# Patient Record
Sex: MALE | Race: WHITE
[De-identification: names, ages, dates, MRNs, and addresses within clinical notes are randomized per-mention and may not be internally consistent; named-entity substitution may affect disease eponyms.]

---

## 2017-02-07 ENCOUNTER — HOSPITAL ENCOUNTER (EMERGENCY)
Dept: HOSPITAL 26 - MED | Age: 22
Discharge: HOME | End: 2017-02-07
Payer: MEDICAID

## 2017-02-07 VITALS — WEIGHT: 198 LBS | HEIGHT: 70 IN | BODY MASS INDEX: 28.35 KG/M2

## 2017-02-07 VITALS — DIASTOLIC BLOOD PRESSURE: 74 MMHG | SYSTOLIC BLOOD PRESSURE: 115 MMHG

## 2017-02-07 VITALS — DIASTOLIC BLOOD PRESSURE: 67 MMHG | SYSTOLIC BLOOD PRESSURE: 118 MMHG

## 2017-02-07 DIAGNOSIS — F41.9: ICD-10-CM

## 2017-02-07 DIAGNOSIS — R50.9: Primary | ICD-10-CM

## 2017-02-07 DIAGNOSIS — R11.2: ICD-10-CM

## 2017-02-07 DIAGNOSIS — R19.7: ICD-10-CM

## 2017-02-07 PROCEDURE — 99283 EMERGENCY DEPT VISIT LOW MDM: CPT

## 2017-02-07 NOTE — NUR
Patient discharged with v/s stable. Written and verbal after care instructions 
given and explained. 

Patient alert, oriented and verbalized understanding of instructions. 
Ambulatory with steady gait. All questions addressed prior to discharge. ID 
band removed. Patient advised to follow up with PMD. Rx of ZOFRAN AND 
ACETAMENOPHEN given. Patient educated on indication of medication including 
possible reaction and side effects. Opportunity to ask questions provided and 
answered.

## 2017-02-07 NOTE — NUR
PATIENT PRESENTS TO ED WITH ABD PAIN, HA, EARACHE, AND N/V/D . PT STATES HE HAS 
HAD THESE SYMPTOMS X2DAYS; HYPERACTIVE BOWEL SOUNDS NOTED IN ALL FOUR 
QUADRANTS. SKIN IS PINK/WARM/DRY; AAOX4 WITH EVEN AND STEADY GAIT; LUNGS CLEAR 
BL; HR EVEN AND REGULAR; PT DENIES ANY FEVER, CP, SOB, OR COUGH AT THIS TIME; 
PATIENT STATES PAIN OF 8/10 AT THIS TIME; VSS; PATIENT POSITIONED FOR COMFORT; 
HOB ELEVATED; BEDRAILS UP X2; BED DOWN. ER MD MADE AWARE OF PT STATUS.

## 2017-03-20 ENCOUNTER — HOSPITAL ENCOUNTER (EMERGENCY)
Dept: HOSPITAL 4 - SED | Age: 22
Discharge: HOME | End: 2017-03-20
Payer: MEDICAID

## 2017-03-20 VITALS — SYSTOLIC BLOOD PRESSURE: 121 MMHG

## 2017-03-20 VITALS — HEIGHT: 70 IN | WEIGHT: 198 LBS | BODY MASS INDEX: 28.35 KG/M2

## 2017-03-20 DIAGNOSIS — K29.20: Primary | ICD-10-CM

## 2017-03-20 DIAGNOSIS — K75.9: ICD-10-CM

## 2017-03-20 DIAGNOSIS — J45.909: ICD-10-CM

## 2017-03-20 LAB
ALBUMIN SERPL BCP-MCNC: 4.2 G/DL (ref 3.4–4.8)
ALT SERPL W P-5'-P-CCNC: 274 U/L (ref 12–78)
ANION GAP SERPL CALCULATED.3IONS-SCNC: < 3 MMOL/L (ref 5–15)
APPEARANCE UR: CLEAR
AST SERPL W P-5'-P-CCNC: 94 U/L (ref 10–37)
BACTERIA URNS QL MICRO: (no result) /HPF
BASOPHILS # BLD AUTO: 0.1 K/UL (ref 0–0.2)
BASOPHILS NFR BLD AUTO: 1.5 % (ref 0–2)
BILIRUB SERPL-MCNC: 2.5 MG/DL (ref 0–1)
BILIRUB UR QL STRIP: (no result)
BUN SERPL-MCNC: 10 MG/DL (ref 8–21)
CALCIUM SERPL-MCNC: 8.6 MG/DL (ref 8.4–11)
CHLORIDE SERPL-SCNC: 101 MMOL/L (ref 98–107)
COLOR UR: YELLOW
CREAT SERPL-MCNC: 1.17 MG/DL (ref 0.55–1.3)
EOSINOPHIL # BLD AUTO: 0.1 K/UL (ref 0–0.4)
EOSINOPHIL NFR BLD AUTO: 1.7 % (ref 0–4)
ERYTHROCYTE [DISTWIDTH] IN BLOOD BY AUTOMATED COUNT: 12.3 % (ref 9–15)
GFR SERPL CREATININE-BSD FRML MDRD: 102 ML/MIN (ref 90–?)
GLUCOSE SERPL-MCNC: 125 MG/DL (ref 70–99)
GLUCOSE UR STRIP-MCNC: NEGATIVE MG/DL
HCT VFR BLD AUTO: 49.7 % (ref 36–54)
HGB BLD-MCNC: 16.9 G/DL (ref 14–18)
HGB UR QL STRIP: (no result)
INR PPP: 1 (ref 0.8–1.2)
KETONES UR STRIP-MCNC: NEGATIVE MG/DL
LEUKOCYTE ESTERASE UR QL STRIP: NEGATIVE
LIPASE SERPL-CCNC: 104 U/L (ref 73–393)
LYMPHOCYTES # BLD AUTO: 1.2 K/UL (ref 1–5.5)
LYMPHOCYTES NFR BLD AUTO: 14 % (ref 20.5–51.5)
MCH RBC QN AUTO: 29 PG (ref 27–31)
MCHC RBC AUTO-ENTMCNC: 34 % (ref 32–36)
MCV RBC AUTO: 86 FL (ref 79–98)
MONOCYTES # BLD MANUAL: 0.7 K/UL (ref 0–1)
MONOCYTES # BLD MANUAL: 8.5 % (ref 1.7–9.3)
MUCOUS THREADS URNS QL MICRO: (no result) /LPF
NEUTROPHILS # BLD AUTO: 6.3 K/UL (ref 1.8–7.7)
NEUTROPHILS NFR BLD AUTO: 74.3 % (ref 40–70)
NITRITE UR QL STRIP: NEGATIVE
PH UR STRIP: 6 [PH] (ref 5–8)
PLATELET # BLD AUTO: 264 K/UL (ref 130–430)
POTASSIUM SERPL-SCNC: 4.3 MMOL/L (ref 3.5–5.1)
PROT SERPL-MCNC: 8.3 G/DL (ref 6.4–8.3)
PROT UR QL STRIP: (no result)
PROTHROMBIN TIME: 11.2 SECS (ref 9.5–12.5)
RBC # BLD AUTO: 5.81 MIL/UL (ref 4.2–6.2)
RBC #/AREA URNS HPF: (no result) /HPF (ref 0–3)
SODIUM SERPLBLD-SCNC: 136 MMOL/L (ref 136–145)
SP GR UR STRIP: 1.02 (ref 1–1.03)
UROBILINOGEN UR STRIP-MCNC: 4 MG/DL (ref 0.2–1)
WBC # BLD AUTO: 8.4 K/UL (ref 4.5–11)
WBC #/AREA URNS HPF: (no result) /HPF (ref 0–3)

## 2018-07-25 ENCOUNTER — HOSPITAL ENCOUNTER (EMERGENCY)
Dept: HOSPITAL 4 - SED | Age: 23
Discharge: HOME | End: 2018-07-25
Payer: SELF-PAY

## 2018-07-25 VITALS — SYSTOLIC BLOOD PRESSURE: 125 MMHG

## 2018-07-25 VITALS — WEIGHT: 195 LBS | BODY MASS INDEX: 27.92 KG/M2 | HEIGHT: 70 IN

## 2018-07-25 DIAGNOSIS — S70.262A: Primary | ICD-10-CM
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
## 2018-07-25 DIAGNOSIS — R03.0: ICD-10-CM

## 2018-07-25 DIAGNOSIS — J45.909: ICD-10-CM

## 2019-05-27 ENCOUNTER — HOSPITAL ENCOUNTER (EMERGENCY)
Dept: HOSPITAL 4 - SED | Age: 24
LOS: 1 days | Discharge: HOME | End: 2019-05-28
Payer: MEDICAID

## 2019-05-27 VITALS — WEIGHT: 165 LBS | BODY MASS INDEX: 23.62 KG/M2 | HEIGHT: 70 IN

## 2019-05-27 VITALS — SYSTOLIC BLOOD PRESSURE: 108 MMHG

## 2019-05-27 DIAGNOSIS — J45.909: ICD-10-CM

## 2019-05-27 DIAGNOSIS — H66.91: Primary | ICD-10-CM

## 2019-05-28 VITALS — SYSTOLIC BLOOD PRESSURE: 108 MMHG

## 2022-01-04 ENCOUNTER — HOSPITAL ENCOUNTER (EMERGENCY)
Dept: HOSPITAL 4 - SED | Age: 27
Discharge: HOME | End: 2022-01-04
Payer: MEDICAID

## 2022-01-04 VITALS — SYSTOLIC BLOOD PRESSURE: 101 MMHG

## 2022-01-04 VITALS — HEIGHT: 70 IN | WEIGHT: 160 LBS | BODY MASS INDEX: 22.9 KG/M2

## 2022-01-04 VITALS — SYSTOLIC BLOOD PRESSURE: 122 MMHG

## 2022-01-04 DIAGNOSIS — Z79.899: ICD-10-CM

## 2022-01-04 DIAGNOSIS — J45.909: ICD-10-CM

## 2022-01-04 DIAGNOSIS — Z88.8: ICD-10-CM

## 2022-01-04 DIAGNOSIS — E11.65: Primary | ICD-10-CM

## 2022-01-04 LAB
ALBUMIN SERPL BCP-MCNC: 3.6 G/DL (ref 3.4–4.8)
ALT SERPL W P-5'-P-CCNC: 22 U/L (ref 12–78)
ANION GAP SERPL CALCULATED.3IONS-SCNC: 9 MMOL/L (ref 5–15)
AST SERPL W P-5'-P-CCNC: 11 U/L (ref 10–37)
BASOPHILS # BLD AUTO: 0 K/UL (ref 0–0.2)
BASOPHILS NFR BLD AUTO: 0.6 % (ref 0–2)
BILIRUB SERPL-MCNC: 1.1 MG/DL (ref 0–1)
BUN SERPL-MCNC: 17 MG/DL (ref 8–21)
CALCIUM SERPL-MCNC: 8.7 MG/DL (ref 8.4–11)
CHLORIDE SERPL-SCNC: 93 MMOL/L (ref 98–107)
CREAT SERPL-MCNC: 0.95 MG/DL (ref 0.55–1.3)
EOSINOPHIL # BLD AUTO: 0.2 K/UL (ref 0–0.4)
EOSINOPHIL NFR BLD AUTO: 2.7 % (ref 0–4)
ERYTHROCYTE [DISTWIDTH] IN BLOOD BY AUTOMATED COUNT: 12.9 % (ref 9–15)
GFR SERPL CREATININE-BSD FRML MDRD: 125 ML/MIN (ref 90–?)
GLUCOSE SERPL-MCNC: 572 MG/DL (ref 70–99)
HCT VFR BLD AUTO: 48.4 % (ref 36–54)
HGB BLD-MCNC: 16.7 G/DL (ref 14–18)
LYMPHOCYTES # BLD AUTO: 1.7 K/UL (ref 1–5.5)
LYMPHOCYTES NFR BLD AUTO: 23.7 % (ref 20.5–51.5)
MCH RBC QN AUTO: 29 PG (ref 27–31)
MCHC RBC AUTO-ENTMCNC: 35 % (ref 32–36)
MCV RBC AUTO: 85 FL (ref 79–98)
MONOCYTES # BLD MANUAL: 0.4 K/UL (ref 0–1)
MONOCYTES # BLD MANUAL: 4.8 % (ref 1.7–9.3)
NEUTROPHILS # BLD AUTO: 5 K/UL (ref 1.8–7.7)
NEUTROPHILS NFR BLD AUTO: 68.2 % (ref 40–70)
PLATELET # BLD AUTO: 250 K/UL (ref 130–430)
POTASSIUM SERPL-SCNC: 4.1 MMOL/L (ref 3.5–5.1)
RBC # BLD AUTO: 5.7 MIL/UL (ref 4.2–6.2)
SODIUM SERPLBLD-SCNC: 131 MMOL/L (ref 136–145)
WBC # BLD AUTO: 7.3 K/UL (ref 4.8–10.8)

## 2022-01-04 PROCEDURE — 36415 COLL VENOUS BLD VENIPUNCTURE: CPT

## 2022-01-04 PROCEDURE — 96374 THER/PROPH/DIAG INJ IV PUSH: CPT

## 2022-01-04 PROCEDURE — 82962 GLUCOSE BLOOD TEST: CPT

## 2022-01-04 PROCEDURE — 99283 EMERGENCY DEPT VISIT LOW MDM: CPT

## 2022-01-04 PROCEDURE — 85025 COMPLETE CBC W/AUTO DIFF WBC: CPT

## 2022-01-04 PROCEDURE — 96361 HYDRATE IV INFUSION ADD-ON: CPT

## 2022-01-04 PROCEDURE — 80053 COMPREHEN METABOLIC PANEL: CPT

## 2023-12-11 ENCOUNTER — HOSPITAL ENCOUNTER (EMERGENCY)
Dept: HOSPITAL 26 - MED | Age: 28
Discharge: LEFT BEFORE DISCHARGE | End: 2023-12-11
Payer: COMMERCIAL

## 2023-12-11 VITALS
OXYGEN SATURATION: 95 % | TEMPERATURE: 99.1 F | HEART RATE: 101 BPM | RESPIRATION RATE: 18 BRPM | DIASTOLIC BLOOD PRESSURE: 65 MMHG | SYSTOLIC BLOOD PRESSURE: 94 MMHG

## 2023-12-11 VITALS — WEIGHT: 152 LBS | HEIGHT: 70 IN | BODY MASS INDEX: 21.76 KG/M2

## 2023-12-11 DIAGNOSIS — R63.0: ICD-10-CM

## 2023-12-11 DIAGNOSIS — R73.9: ICD-10-CM

## 2023-12-11 DIAGNOSIS — Z20.822: ICD-10-CM

## 2023-12-11 DIAGNOSIS — R61: ICD-10-CM

## 2023-12-11 DIAGNOSIS — Z53.21: ICD-10-CM

## 2023-12-11 DIAGNOSIS — R51.9: ICD-10-CM

## 2023-12-11 DIAGNOSIS — R11.2: Primary | ICD-10-CM

## 2023-12-11 LAB — FLUBV AG UPPER RESP QL IA.RAPID: NEGATIVE

## 2023-12-27 ENCOUNTER — HOSPITAL ENCOUNTER (EMERGENCY)
Dept: HOSPITAL 26 - MED | Age: 28
Discharge: LEFT BEFORE DISCHARGE | End: 2023-12-27
Payer: COMMERCIAL

## 2023-12-27 VITALS — WEIGHT: 170 LBS | BODY MASS INDEX: 25.76 KG/M2 | HEIGHT: 68 IN

## 2023-12-27 VITALS
DIASTOLIC BLOOD PRESSURE: 80 MMHG | SYSTOLIC BLOOD PRESSURE: 132 MMHG | HEART RATE: 84 BPM | RESPIRATION RATE: 17 BRPM | OXYGEN SATURATION: 99 % | TEMPERATURE: 97.4 F

## 2023-12-27 DIAGNOSIS — R55: Primary | ICD-10-CM

## 2023-12-27 DIAGNOSIS — Z53.21: ICD-10-CM

## 2024-04-03 ENCOUNTER — HOSPITAL ENCOUNTER (EMERGENCY)
Dept: HOSPITAL 4 - SED | Age: 29
LOS: 1 days | Discharge: HOME | End: 2024-04-04
Payer: COMMERCIAL

## 2024-04-03 VITALS — BODY MASS INDEX: 17.93 KG/M2 | WEIGHT: 155 LBS | HEIGHT: 78 IN

## 2024-04-03 VITALS
TEMPERATURE: 97.5 F | HEART RATE: 64 BPM | RESPIRATION RATE: 20 BRPM | SYSTOLIC BLOOD PRESSURE: 112 MMHG | OXYGEN SATURATION: 98 %

## 2024-04-03 DIAGNOSIS — Z88.6: ICD-10-CM

## 2024-04-03 DIAGNOSIS — J45.909: ICD-10-CM

## 2024-04-03 DIAGNOSIS — E11.9: ICD-10-CM

## 2024-04-03 DIAGNOSIS — Z88.1: ICD-10-CM

## 2024-04-03 DIAGNOSIS — J02.9: Primary | ICD-10-CM

## 2024-04-04 VITALS
RESPIRATION RATE: 20 BRPM | HEART RATE: 63 BPM | OXYGEN SATURATION: 98 % | TEMPERATURE: 98 F | SYSTOLIC BLOOD PRESSURE: 110 MMHG